# Patient Record
Sex: FEMALE | Race: WHITE | NOT HISPANIC OR LATINO | URBAN - METROPOLITAN AREA
[De-identification: names, ages, dates, MRNs, and addresses within clinical notes are randomized per-mention and may not be internally consistent; named-entity substitution may affect disease eponyms.]

---

## 2023-12-05 ENCOUNTER — TELEPHONE (OUTPATIENT)
Dept: PRIMARY CARE | Facility: CLINIC | Age: 33
End: 2023-12-05
Payer: COMMERCIAL

## 2023-12-05 DIAGNOSIS — Z00.00 ENCOUNTER FOR HEALTH MAINTENANCE EXAMINATION IN ADULT: Primary | ICD-10-CM

## 2023-12-27 ENCOUNTER — OFFICE VISIT (OUTPATIENT)
Dept: OTOLARYNGOLOGY | Facility: CLINIC | Age: 33
End: 2023-12-27
Payer: COMMERCIAL

## 2023-12-27 VITALS — HEIGHT: 63 IN | WEIGHT: 113.8 LBS | BODY MASS INDEX: 20.16 KG/M2 | TEMPERATURE: 97.8 F

## 2023-12-27 DIAGNOSIS — J34.89 NASAL SEPTAL PERFORATION: Primary | ICD-10-CM

## 2023-12-27 PROCEDURE — 1036F TOBACCO NON-USER: CPT | Performed by: OTOLARYNGOLOGY

## 2023-12-27 PROCEDURE — 99203 OFFICE O/P NEW LOW 30 MIN: CPT | Performed by: OTOLARYNGOLOGY

## 2023-12-27 PROCEDURE — 31231 NASAL ENDOSCOPY DX: CPT | Performed by: OTOLARYNGOLOGY

## 2023-12-27 RX ORDER — SERTRALINE HYDROCHLORIDE 100 MG/1
100 TABLET, FILM COATED ORAL
COMMUNITY
Start: 2017-11-03 | End: 2024-02-13 | Stop reason: SDUPTHER

## 2023-12-27 ASSESSMENT — PATIENT HEALTH QUESTIONNAIRE - PHQ9
SUM OF ALL RESPONSES TO PHQ9 QUESTIONS 1 AND 2: 0
2. FEELING DOWN, DEPRESSED OR HOPELESS: NOT AT ALL
1. LITTLE INTEREST OR PLEASURE IN DOING THINGS: NOT AT ALL

## 2023-12-27 NOTE — PROGRESS NOTES
"Chief Complaint:  Septal perforation/headache    Referring Provider: No ref. provider found    History of Present Illness:    Naty Lee is a 33 y.o. female, presenting for evaluation of headache, post-nasal drainage, and septal perforation. She was told she had a septal perforation in 2017 and states it was from OCD-related nasal picking. She has also used intranasal drugs (specifically Ketamine) for an undetermined amount of time. She denies repeated antibiotics, infections, etc. She does have chronic daily headaches which she is concerned may be stemming from the chronic crusting/post-nasal drainage.     ?  Review of Systems:    Review of symptoms was negative except for those stated including Cardiopulmonary, Genitourinary, Gastrointestinal, Psychological, Sleep pattern, Endocrine, Eyes, Neurologic, Musculoskeletal, Skin, Hematologic/Lymphatic and Allergic/Immunologic.     Medical History:    I have reviewed the patient's updated past medical history, surgical history, family history, social history, as well as current medications and allergies as of 12/27/2023. Changes to these items have been updated and marked as reviewed in the electronic medical record.    Physical Exam:    Vitals:  height is 1.594 m (5' 2.75\") and weight is 51.6 kg (113 lb 12.8 oz). Her temperature is 36.6 °C (97.8 °F).   General: Patient doing well overall and is in no apparent distress.  Psych: Pleasant affect, and answers questions appropriately.  Head & Face: Symmetric facial movements  Eyes: Pupils equal, round, reactive.  Extraocular movements intact without gaze restrictions or nystagmus. No epiphora.  Ears:  External auditory canals are normal.  Tympanic membranes are clear.  No middle ear effusion is seen.  All middle ear landmarks are normal.  Nose: Anterior rhinoscopy revealed 1.5 x 1.5 cm anterior septal perforation with adjacent crusting.  More posterior areas of the nasal cavity could not be completely examined.  Oral " Cavity/Oropharynx:  Without lesions or masses to visual exam.  Neck: Supple without lymphadenopathy.  Lungs: Non-labored, and without evidence of stridor.  Cardiac: Pulses are strong, well-perfused.  Extremities: Without gross evidence of clubbing, cyanosis, or edema.  Neuro: Cranial nerves II-XII grossly intact; Intact facial movements.    Procedure:  Rigid nasal endoscopy (30781)  Pre-procedure diagnosis/Indication for procedure:  To evaluate areas not visualized on anterior rhinoscopy   Anesthesia:  1% phenylephrine and 4% lidocaine topical spray   Description:  A 0-degree 4-mm rigid nasal endoscope was used to examine the left and right nasal cavities.  The nasal valve areas were examined for abnormalities or collapse.  The inferior and middle turbinates were evaluated.  The middle and superior meatuses, and the sphenoethmoid recesses were examined and inspected for mucopurulence and polyps. Once the endoscope was withdrawn, the patient was noted to have tolerated the procedure well without complications and was returned to ambulatory status.    Findings:    There is a 1.5x1.5 cm septal perforation with some crusting along the posterior margin and surrounding mucopurulence. The turbinates appear somewhat hyperemic and edematous and there is some mild crusting along them posteriorly. The nasopharynx is clear and the middle meatus is clear bilaterally. She tolerated the procedure well.       Assessment:     Naty Lee is a 33 y.o. female with septal perforation, post-nasal drainage, headaches (possibly unrelated vs. rhinogenic headache from obstructing crusts)    Plan:      We discussed placing a septal button in the office. I advised that it would be a temporizing measure to hopefully prevent crusting in the nose but that she would require a definitive operative repair if she wanted to avoid the button and close the perforation. I also advised her to discontinue intranasal drug use to prevent the perforation from  worsening. We will plan to obtain a septal button and keep it for her when she returns to [place]. She will likely require an anxiolytic prior to this appointment.      Gael Mc MD, M.Eng.   of Otolaryngology - Head & Neck Surgery  Division of Rhinology and Endoscopic Skull Base Surgery  [facility]

## 2024-02-12 PROBLEM — E55.9 VITAMIN D DEFICIENCY: Status: ACTIVE | Noted: 2024-02-12

## 2024-02-12 PROBLEM — N30.90 RECURRENT CYSTITIS: Status: ACTIVE | Noted: 2024-02-12

## 2024-02-12 PROBLEM — R53.83 FATIGUE: Status: ACTIVE | Noted: 2024-02-12

## 2024-02-12 PROBLEM — R51.9 CHRONIC HEAD PAIN: Status: ACTIVE | Noted: 2024-02-12

## 2024-02-12 PROBLEM — M76.32 ILIOTIBIAL BAND SYNDROME OF LEFT SIDE: Status: ACTIVE | Noted: 2024-02-12

## 2024-02-12 PROBLEM — R35.0 URINARY FREQUENCY: Status: ACTIVE | Noted: 2024-02-12

## 2024-02-12 PROBLEM — R51.9 CHRONIC DAILY HEADACHE: Status: ACTIVE | Noted: 2017-11-03

## 2024-02-12 PROBLEM — F32.A DEPRESSION: Status: ACTIVE | Noted: 2024-02-12

## 2024-02-12 PROBLEM — G89.29 CHRONIC HEAD PAIN: Status: ACTIVE | Noted: 2024-02-12

## 2024-02-13 ENCOUNTER — OFFICE VISIT (OUTPATIENT)
Dept: PRIMARY CARE | Facility: CLINIC | Age: 34
End: 2024-02-13
Payer: COMMERCIAL

## 2024-02-13 VITALS
SYSTOLIC BLOOD PRESSURE: 91 MMHG | WEIGHT: 110 LBS | HEART RATE: 61 BPM | OXYGEN SATURATION: 97 % | DIASTOLIC BLOOD PRESSURE: 54 MMHG | BODY MASS INDEX: 19.49 KG/M2 | HEIGHT: 63 IN | TEMPERATURE: 98.6 F

## 2024-02-13 DIAGNOSIS — Z23 NEED FOR TDAP VACCINATION: ICD-10-CM

## 2024-02-13 DIAGNOSIS — F32.A DEPRESSION, UNSPECIFIED DEPRESSION TYPE: ICD-10-CM

## 2024-02-13 DIAGNOSIS — Z11.3 SCREEN FOR STD (SEXUALLY TRANSMITTED DISEASE): ICD-10-CM

## 2024-02-13 DIAGNOSIS — Z00.00 PHYSICAL EXAM: Primary | ICD-10-CM

## 2024-02-13 PROCEDURE — 1036F TOBACCO NON-USER: CPT | Performed by: INTERNAL MEDICINE

## 2024-02-13 PROCEDURE — 90715 TDAP VACCINE 7 YRS/> IM: CPT | Performed by: INTERNAL MEDICINE

## 2024-02-13 PROCEDURE — 90471 IMMUNIZATION ADMIN: CPT | Performed by: INTERNAL MEDICINE

## 2024-02-13 PROCEDURE — 99395 PREV VISIT EST AGE 18-39: CPT | Performed by: INTERNAL MEDICINE

## 2024-02-13 RX ORDER — SERTRALINE HYDROCHLORIDE 50 MG/1
100 TABLET, FILM COATED ORAL DAILY
Qty: 180 TABLET | Refills: 3 | Status: SHIPPED | OUTPATIENT
Start: 2024-02-13 | End: 2025-02-12

## 2024-02-13 ASSESSMENT — PATIENT HEALTH QUESTIONNAIRE - PHQ9
1. LITTLE INTEREST OR PLEASURE IN DOING THINGS: MORE THAN HALF THE DAYS
7. TROUBLE CONCENTRATING ON THINGS, SUCH AS READING THE NEWSPAPER OR WATCHING TELEVISION: MORE THAN HALF THE DAYS
9. THOUGHTS THAT YOU WOULD BE BETTER OFF DEAD, OR OF HURTING YOURSELF: NOT AT ALL
6. FEELING BAD ABOUT YOURSELF - OR THAT YOU ARE A FAILURE OR HAVE LET YOURSELF OR YOUR FAMILY DOWN: MORE THAN HALF THE DAYS
3. TROUBLE FALLING OR STAYING ASLEEP OR SLEEPING TOO MUCH: MORE THAN HALF THE DAYS
8. MOVING OR SPEAKING SO SLOWLY THAT OTHER PEOPLE COULD HAVE NOTICED. OR THE OPPOSITE, BEING SO FIGETY OR RESTLESS THAT YOU HAVE BEEN MOVING AROUND A LOT MORE THAN USUAL: NOT AT ALL
5. POOR APPETITE OR OVEREATING: NOT AT ALL
SUM OF ALL RESPONSES TO PHQ QUESTIONS 1-9: 12
2. FEELING DOWN, DEPRESSED OR HOPELESS: MORE THAN HALF THE DAYS
SUM OF ALL RESPONSES TO PHQ9 QUESTIONS 1 AND 2: 4
4. FEELING TIRED OR HAVING LITTLE ENERGY: MORE THAN HALF THE DAYS

## 2024-02-13 ASSESSMENT — ANXIETY QUESTIONNAIRES
3. WORRYING TOO MUCH ABOUT DIFFERENT THINGS: MORE THAN HALF THE DAYS
5. BEING SO RESTLESS THAT IT IS HARD TO SIT STILL: NOT AT ALL
GAD7 TOTAL SCORE: 8
1. FEELING NERVOUS, ANXIOUS, OR ON EDGE: MORE THAN HALF THE DAYS
6. BECOMING EASILY ANNOYED OR IRRITABLE: SEVERAL DAYS
2. NOT BEING ABLE TO STOP OR CONTROL WORRYING: MORE THAN HALF THE DAYS
4. TROUBLE RELAXING: NOT AT ALL
7. FEELING AFRAID AS IF SOMETHING AWFUL MIGHT HAPPEN: SEVERAL DAYS

## 2024-02-13 NOTE — PROGRESS NOTES
"SUBJECTIVE:   Naty Lee is a 34 y.o. female presenting for her annual physical/wellness.  PCP: Tiffanie España MD  Physical and med refills  She still lives in [facility].  Would like routine check and std check. LMP one month ago.  No std symptoms    Colon screening: na  Last mammogram: na  Dexa na  Vaccines has had HPV. Due for tdap.  Diet:   healthy in general  Exercises:  regularly  Lab Results   Component Value Date    HGBA1C 5.1 12/28/2022     Lab Results   Component Value Date    CREATININE 0.69 12/28/2022     Lab Results   Component Value Date    WBC 6.9 12/28/2022    HGB 13.1 12/28/2022    HCT 40.3 12/28/2022    MCV 98 12/28/2022     12/28/2022     Lab Results   Component Value Date    CHOL 142 12/28/2022    CHOL 145 10/04/2021     Lab Results   Component Value Date    HDL 59.7 12/28/2022    HDL 69.6 10/04/2021     No results found for: \"LDLCALC\"  Lab Results   Component Value Date    TRIG 85 12/28/2022    TRIG 59 10/04/2021     No components found for: \"CHOLHDL\"       ROS:   Feeling well. No dyspnea or chest pain on exertion. No abdominal pain, change in bowel habits, black or bloody stools. No urinary tract or  symptoms.  No breast concerns. No neurological complaints.    OBJECTIVE:   The patient appears well, alert, oriented x 3, in no distress.   BP 91/54   Pulse 61   Temp 37 °C (98.6 °F)   Ht 1.607 m (5' 3.25\")   Wt 49.9 kg (110 lb)   SpO2 97%   BMI 19.33 kg/m²   ENT normal.  Neck supple. No adenopathy or thyromegaly. SHIKHA. Lungs are clear, good air entry, no wheezes, rhonchi or rales. S1 and S2 normal, no murmurs, regular rate and rhythm. Abdomen is soft without tenderness, guarding, mass or organomegaly.  exam deferred to Gyn. Extremities show no edema, normal peripheral pulses. Neurological is normal without focal findings.      1. Physical exam        2. Depression, unspecified depression type  sertraline (Zoloft) 50 mg tablet      3. Screen for STD (sexually transmitted disease)  " Hepatitis C Antibody, HIV 1/2 Antigen/Antibody Screen with Reflex to Confirmation, C. Trachomatis / N. Gonorrhoeae, Amplified Detection      4. Need for Tdap vaccination  Tdap vaccine, age 7 years and older  (BOOSTRIX)

## 2024-02-14 ENCOUNTER — LAB (OUTPATIENT)
Dept: LAB | Facility: LAB | Age: 34
End: 2024-02-14
Payer: COMMERCIAL

## 2024-02-14 ENCOUNTER — OFFICE VISIT (OUTPATIENT)
Dept: OTOLARYNGOLOGY | Facility: CLINIC | Age: 34
End: 2024-02-14
Payer: COMMERCIAL

## 2024-02-14 ENCOUNTER — APPOINTMENT (OUTPATIENT)
Dept: OTOLARYNGOLOGY | Facility: CLINIC | Age: 34
End: 2024-02-14
Payer: COMMERCIAL

## 2024-02-14 VITALS — TEMPERATURE: 97.3 F | BODY MASS INDEX: 20.11 KG/M2 | WEIGHT: 114.4 LBS

## 2024-02-14 DIAGNOSIS — Z11.3 SCREEN FOR STD (SEXUALLY TRANSMITTED DISEASE): ICD-10-CM

## 2024-02-14 DIAGNOSIS — J34.89 NASAL OBSTRUCTION: ICD-10-CM

## 2024-02-14 DIAGNOSIS — Z00.00 ENCOUNTER FOR HEALTH MAINTENANCE EXAMINATION IN ADULT: ICD-10-CM

## 2024-02-14 DIAGNOSIS — J34.89 NASAL SEPTAL PERFORATION: Primary | ICD-10-CM

## 2024-02-14 LAB
ALBUMIN SERPL BCP-MCNC: 4.5 G/DL (ref 3.4–5)
ALP SERPL-CCNC: 46 U/L (ref 33–110)
ALT SERPL W P-5'-P-CCNC: 7 U/L (ref 7–45)
ANION GAP SERPL CALC-SCNC: 12 MMOL/L (ref 10–20)
AST SERPL W P-5'-P-CCNC: 20 U/L (ref 9–39)
BILIRUB SERPL-MCNC: 0.4 MG/DL (ref 0–1.2)
BUN SERPL-MCNC: 12 MG/DL (ref 6–23)
CALCIUM SERPL-MCNC: 9.6 MG/DL (ref 8.6–10.6)
CHLORIDE SERPL-SCNC: 104 MMOL/L (ref 98–107)
CHOLEST SERPL-MCNC: 153 MG/DL (ref 0–199)
CHOLESTEROL/HDL RATIO: 2.3
CO2 SERPL-SCNC: 29 MMOL/L (ref 21–32)
CREAT SERPL-MCNC: 0.71 MG/DL (ref 0.5–1.05)
EGFRCR SERPLBLD CKD-EPI 2021: >90 ML/MIN/1.73M*2
ERYTHROCYTE [DISTWIDTH] IN BLOOD BY AUTOMATED COUNT: 12.8 % (ref 11.5–14.5)
EST. AVERAGE GLUCOSE BLD GHB EST-MCNC: 97 MG/DL
GLUCOSE SERPL-MCNC: 80 MG/DL (ref 74–99)
HBA1C MFR BLD: 5 %
HCT VFR BLD AUTO: 39.5 % (ref 36–46)
HCV AB SER QL: NONREACTIVE
HDLC SERPL-MCNC: 66 MG/DL
HGB BLD-MCNC: 12.6 G/DL (ref 12–16)
HIV 1+2 AB+HIV1 P24 AG SERPL QL IA: NONREACTIVE
LDLC SERPL CALC-MCNC: 72 MG/DL
MCH RBC QN AUTO: 30.7 PG (ref 26–34)
MCHC RBC AUTO-ENTMCNC: 31.9 G/DL (ref 32–36)
MCV RBC AUTO: 96 FL (ref 80–100)
NON HDL CHOLESTEROL: 87 MG/DL (ref 0–149)
NRBC BLD-RTO: 0 /100 WBCS (ref 0–0)
PLATELET # BLD AUTO: 290 X10*3/UL (ref 150–450)
POTASSIUM SERPL-SCNC: 4 MMOL/L (ref 3.5–5.3)
PROT SERPL-MCNC: 6.5 G/DL (ref 6.4–8.2)
RBC # BLD AUTO: 4.1 X10*6/UL (ref 4–5.2)
SODIUM SERPL-SCNC: 141 MMOL/L (ref 136–145)
TRIGL SERPL-MCNC: 74 MG/DL (ref 0–149)
VLDL: 15 MG/DL (ref 0–40)
WBC # BLD AUTO: 9.1 X10*3/UL (ref 4.4–11.3)

## 2024-02-14 PROCEDURE — 80053 COMPREHEN METABOLIC PANEL: CPT

## 2024-02-14 PROCEDURE — 99214 OFFICE O/P EST MOD 30 MIN: CPT | Performed by: OTOLARYNGOLOGY

## 2024-02-14 PROCEDURE — 85027 COMPLETE CBC AUTOMATED: CPT

## 2024-02-14 PROCEDURE — 36415 COLL VENOUS BLD VENIPUNCTURE: CPT

## 2024-02-14 PROCEDURE — 87389 HIV-1 AG W/HIV-1&-2 AB AG IA: CPT

## 2024-02-14 PROCEDURE — 1036F TOBACCO NON-USER: CPT | Performed by: OTOLARYNGOLOGY

## 2024-02-14 PROCEDURE — 86803 HEPATITIS C AB TEST: CPT

## 2024-02-14 PROCEDURE — 87800 DETECT AGNT MULT DNA DIREC: CPT

## 2024-02-14 PROCEDURE — 31231 NASAL ENDOSCOPY DX: CPT | Performed by: OTOLARYNGOLOGY

## 2024-02-14 PROCEDURE — 80061 LIPID PANEL: CPT

## 2024-02-14 PROCEDURE — 83036 HEMOGLOBIN GLYCOSYLATED A1C: CPT

## 2024-02-14 ASSESSMENT — ENCOUNTER SYMPTOMS: DEPRESSION: 0

## 2024-02-14 NOTE — PROGRESS NOTES
Chief Complaint:  Septal perforation/headache    Referring Provider: No ref. provider found    History of Present Illness:    Naty Lee is a 34 y.o. female, presenting for evaluation of headache, post-nasal drainage, and septal perforation.  She is following up today to discuss possible placement of a septal button versus repair.  She is quite anxious about either intervention.  No significant changes to her symptomology.    ?  Review of Systems:    Review of symptoms was negative except for those stated including Cardiopulmonary, Genitourinary, Gastrointestinal, Psychological, Sleep pattern, Endocrine, Eyes, Neurologic, Musculoskeletal, Skin, Hematologic/Lymphatic and Allergic/Immunologic.     Medical History:    I have reviewed the patient's updated past medical history, surgical history, family history, social history, as well as current medications and allergies as of 2/14/2024. Changes to these items have been updated and marked as reviewed in the electronic medical record.    Physical Exam:    Vitals:  weight is 51.9 kg (114 lb 6.4 oz). Her temperature is 36.3 °C (97.3 °F).   General: Patient doing well overall and is in no apparent distress.  Psych: Pleasant affect, and answers questions appropriately.  Head & Face: Symmetric facial movements  Eyes: Pupils equal, round, reactive.  Extraocular movements intact without gaze restrictions or nystagmus. No epiphora.  Ears:  External auditory canals are normal.  Tympanic membranes are clear.  No middle ear effusion is seen.  All middle ear landmarks are normal.  Nose: Anterior rhinoscopy revealed 1.5 x 1.5 cm anterior septal perforation with adjacent crusting although improved from previous examination..  More posterior areas of the nasal cavity could not be completely examined.  Oral Cavity/Oropharynx:  Without lesions or masses to visual exam.  Neck: Supple without lymphadenopathy.  Lungs: Non-labored, and without evidence of stridor.  Cardiac: Pulses are  strong, well-perfused.  Extremities: Without gross evidence of clubbing, cyanosis, or edema.  Neuro: Cranial nerves II-XII grossly intact; Intact facial movements.    Procedure:  Rigid nasal endoscopy (56970)  Pre-procedure diagnosis/Indication for procedure:  To evaluate areas not visualized on anterior rhinoscopy   Anesthesia:  1% phenylephrine and 4% lidocaine topical spray   Description:  A 0-degree 4-mm rigid nasal endoscope was used to examine the left and right nasal cavities.  The nasal valve areas were examined for abnormalities or collapse.  The inferior and middle turbinates were evaluated.  The middle and superior meatuses, and the sphenoethmoid recesses were examined and inspected for mucopurulence and polyps. Once the endoscope was withdrawn, the patient was noted to have tolerated the procedure well without complications and was returned to ambulatory status.    Findings:    There is a 1.5x1.5 cm septal perforation with some mild crusting along the posterior margin and surrounding mucopurulence. The turbinates appear somewhat hyperemic and edematous.The nasopharynx is clear and the middle meatus is clear bilaterally. She tolerated the procedure well.       Assessment:     Naty Lee is a 34 y.o. female with septal perforation, post-nasal drainage, headaches (possibly unrelated vs. rhinogenic headache from obstructing crusts)    Plan:      We had an extensive conversation about her septal perforation as well as her symptoms and possible strategies for mitigating them.  I explained the risks benefits and alternatives to placing a septal button.  I also in general discussed what definitive repair with mucosal rotational flaps would entail.  I also explained that her nasal obstruction may only be partially related to the perforation and surrounding inflammation and that there may be other evaluation and treatment that may be required.  They would like to consult with our facial plastic surgery colleagues  to see if a definitive repair would be a good option for her.  She is also interested in potentially changing the way her nose looks so I have referred them to facial plastics for additional evaluation.      Gael Mc MD, M.Eng.   of Otolaryngology - Head & Neck Surgery  Division of Rhinology and Endoscopic Skull Base Surgery  [facility]

## 2024-02-15 LAB
C TRACH RRNA SPEC QL NAA+PROBE: NEGATIVE
N GONORRHOEA DNA SPEC QL PROBE+SIG AMP: NEGATIVE

## 2024-03-29 PROBLEM — J34.89 NASAL OBSTRUCTION: Status: ACTIVE | Noted: 2024-03-29

## 2024-03-29 PROBLEM — R06.89 DIFFICULTY BREATHING: Status: ACTIVE | Noted: 2024-03-29

## 2024-03-29 PROBLEM — J34.89 NASAL SEPTAL PERFORATION: Status: ACTIVE | Noted: 2024-03-29

## 2024-03-29 NOTE — PROGRESS NOTES
Facial Plastic & Reconstructive Surgery    Reason for consult: Nasal perforation, PND, nasal obstruction    Referring provider: Dr. Mc    Chief Complaint: Obstructed breathing, perforation, PND    33yo female referred to our office for surgical evaluation for 1.5 cm diameter septal perforation found by Dr. Mc.  She is here to discuss septal repair surgery and nasal valve collapse with aesthetic concerns.    Constant, present year round, does not fluctuate. It affects the patients ability to sleep, exercise, and is troubling during the day.    Symptoms began: Incidentally discovered in 2017 for congestion, she has chronic PND that is bothersome    Nasal steroid or spray: Not recommended in setting of perforation. She does use saline spray. No ipratropium spray    Site of obstruction: Bilateral    Previous Otolaryngology Provider: 2017 [facility]  Previous documentation for this patient was extensively reviewed including specific reasons for evaluation. Complex nature of complaint and medical issues prompting evaluation for surgical consideration by facial plastic & reconstructive surgeon.    Previous surgery: No    Previous CT/MRI imaging of the nasal cavity and sinuses: None    Trauma history: No    Sleep apnea or snoring history: No    Allergic rhinitis, sinusitis history: No    Smoking: No    Aesthetic concern: Yes    Past Medical History  She has no past medical history on file.    Surgical History  She has a past surgical history that includes Other surgical history (05/08/2020).     Social History  She reports that she has never smoked. She has never used smokeless tobacco. She reports current alcohol use of about 5.0 standard drinks of alcohol per week. She reports that she does not use drugs.    Family History  Family History   Problem Relation Name Age of Onset    No Known Problems Mother      No Known Problems Father      No Known Problems Sister      No Known Problems Brother           Allergies  Patient has no known allergies.    Physical exam    General: Well-developed and well-nourished in appearance.  Skin: No rashes or concerning lesions on the visible portions of the skin.  Eyes: Extraocular movements intact. Visual fields grossly normal.  Ears: Pinna are normal in shape and position. External canals are patent.  Oral Cavity/Oropharynx: Dentition is intact. Mucous membranes moist. No masses or lesions.  Respiratory: No respiratory distress. Quiet breathing without stertor or stridor.  Cardiovascular: Regular rate and rhythm. Warm extremities with equal pulses.  Psych: Normal mood and affect. Judgement and insight appropriate.  Neuro: Alert and oriented. CN II-XII grossly intact. No focal deficits.  Musculoskeletal: Gait intact. Moves all extremities well without apparent deformities.    A comprehensive facial exam was performed with the following highlights:    Nasal skin type: Moderate thickness    External exam:  Tip: Bulbous  Tip rotation: under  Projection: sid  Dorsum: sid  Base width: wide  Asymmetries: mild tip asymmetries    Internal exam:   Septum: septal perforation present more posteriorly, mild septal deviations  Inferior turbinates: hypertrophied turbinates bilaterally  Nasal valve angle: reduced with significant valve collapse on inspiratory phase shown on photodocumentation                Intranasal examination performed with limited view on anterior rhinoscopy.    Procedures:    Procedure: Rigid diagnostic nasal endoscopy  Surgeon: Delroy Galvan MD  Anesthesia: None  Timeout: Performed  Findings: A 0-degree rigid endoscope was passed through the patient's bilateral naris. The first pass was along the floor of the nose to the nasopharynx. The second pass was to the area of the middle meatus. The third pass was to the sphenoethmoidal recess.    Septum: Deviation is S shaped with presence of a 1.5 AP perforation.  Internal Nasal Valve: Angle is reduced, narrowing the nasal  airway bilaterally.    Right nasal cavity:  Inferior turbinate: 2+  Inferior meatus: Clear, no discharge, no polyps/masses/lesions  Middle meatus: Clear, no discharge, no polyps/masses/lesions    Left nasal cavity:  Inferior turbinate: 2+  Inferior meatus: Clear, no discharge, no polyps/masses/lesions  Middle meatus: Clear, no discharge, no polyps/masses/lesions  Nasopharynx: Clear, no discharge, no masses/lesions    Modified [place] Maneuver: Performed with a cotton tipped applicator, markedly improves breathing bilaterally.    Assessment - This patient has a septal perforation, PND, valve collapse, septal deviation, chronic picking of the nose now subsided. The cause is multifactorial with septal deviation, perforation, nasal valve narrowing, turbinate hypertrophy, and some dynamic nasal valve collapse as well. Correction of these issues would require a septoplasty with septal perforation repair, fascia grafting wrapped with PDS plate, repair of nasal valve collapse with structural grafting, and a bilateral turbinate reduction. We discussed the medical necessity of this at length, as well as the risks and limitations of the procedures. We discussed cosmetic rhinoplasty and alarplasty at the time of operation. All questions were answered.      Plan - Would like to trial ipratropium spray first to test if PND would resolve with medical treatment as this is most bothersome, otherwise may consider surgery. RTC 1-2 months.    Today, I was in direct face-to-face consultation with the patient, of which greater than 50% of the time was spent discussing the diagnoses, treatment plan, counseling, and care coordination. The patient and/or caregiver voiced understanding of our conversation and all questions were satisfactorily answered.    Total time for this visit: 30

## 2024-04-03 ENCOUNTER — OFFICE VISIT (OUTPATIENT)
Dept: OTOLARYNGOLOGY | Facility: CLINIC | Age: 34
End: 2024-04-03
Payer: COMMERCIAL

## 2024-04-03 VITALS
HEART RATE: 77 BPM | DIASTOLIC BLOOD PRESSURE: 64 MMHG | SYSTOLIC BLOOD PRESSURE: 115 MMHG | WEIGHT: 112 LBS | HEIGHT: 63 IN | BODY MASS INDEX: 19.84 KG/M2

## 2024-04-03 DIAGNOSIS — R06.89 DIFFICULTY BREATHING: Primary | ICD-10-CM

## 2024-04-03 DIAGNOSIS — J34.89 NASAL OBSTRUCTION: ICD-10-CM

## 2024-04-03 DIAGNOSIS — J34.3 HYPERTROPHY OF NASAL TURBINATES: ICD-10-CM

## 2024-04-03 DIAGNOSIS — R09.82 POST-NASAL DRIP: ICD-10-CM

## 2024-04-03 DIAGNOSIS — J34.89 NASAL SEPTAL PERFORATION: ICD-10-CM

## 2024-04-03 DIAGNOSIS — J34.2 DEVIATED NASAL SEPTUM: ICD-10-CM

## 2024-04-03 DIAGNOSIS — J30.89 NON-SEASONAL ALLERGIC RHINITIS DUE TO OTHER ALLERGIC TRIGGER: ICD-10-CM

## 2024-04-03 PROCEDURE — 31231 NASAL ENDOSCOPY DX: CPT | Performed by: OTOLARYNGOLOGY

## 2024-04-03 PROCEDURE — 99214 OFFICE O/P EST MOD 30 MIN: CPT | Performed by: OTOLARYNGOLOGY

## 2024-04-03 RX ORDER — IPRATROPIUM BROMIDE 42 UG/1
2 SPRAY, METERED NASAL 3 TIMES DAILY
Qty: 15 ML | Refills: 0 | Status: SHIPPED | OUTPATIENT
Start: 2024-04-03 | End: 2024-05-17 | Stop reason: SDUPTHER

## 2024-04-29 ENCOUNTER — APPOINTMENT (OUTPATIENT)
Dept: OTOLARYNGOLOGY | Facility: CLINIC | Age: 34
End: 2024-04-29
Payer: COMMERCIAL

## 2024-05-17 ENCOUNTER — TELEPHONE (OUTPATIENT)
Dept: OTOLARYNGOLOGY | Facility: CLINIC | Age: 34
End: 2024-05-17
Payer: COMMERCIAL

## 2024-05-17 DIAGNOSIS — J30.89 NON-SEASONAL ALLERGIC RHINITIS DUE TO OTHER ALLERGIC TRIGGER: ICD-10-CM

## 2024-05-17 DIAGNOSIS — R06.89 DIFFICULTY BREATHING: Primary | ICD-10-CM

## 2024-05-17 RX ORDER — IPRATROPIUM BROMIDE 42 UG/1
2 SPRAY, METERED NASAL 3 TIMES DAILY
Qty: 15 ML | Refills: 5 | Status: SHIPPED | OUTPATIENT
Start: 2024-05-17 | End: 2024-05-21

## 2024-05-20 NOTE — TELEPHONE ENCOUNTER
Rx refilled per patient request for Ipotropium.  Patient contacted with this update.     Atul Fields PA-C

## 2025-02-14 DIAGNOSIS — F32.A DEPRESSION, UNSPECIFIED DEPRESSION TYPE: ICD-10-CM

## 2025-02-17 RX ORDER — SERTRALINE HYDROCHLORIDE 50 MG/1
100 TABLET, FILM COATED ORAL DAILY
Qty: 60 TABLET | Refills: 0 | Status: SHIPPED | OUTPATIENT
Start: 2025-02-17 | End: 2025-03-19

## 2025-02-27 ENCOUNTER — APPOINTMENT (OUTPATIENT)
Dept: PRIMARY CARE | Facility: CLINIC | Age: 35
End: 2025-02-27
Payer: COMMERCIAL

## 2025-02-27 VITALS
TEMPERATURE: 98.5 F | BODY MASS INDEX: 20.55 KG/M2 | OXYGEN SATURATION: 96 % | WEIGHT: 116 LBS | DIASTOLIC BLOOD PRESSURE: 56 MMHG | HEART RATE: 78 BPM | SYSTOLIC BLOOD PRESSURE: 92 MMHG | HEIGHT: 63 IN

## 2025-02-27 DIAGNOSIS — Z00.00 PHYSICAL EXAM: Primary | ICD-10-CM

## 2025-02-27 DIAGNOSIS — J30.89 NON-SEASONAL ALLERGIC RHINITIS DUE TO OTHER ALLERGIC TRIGGER: ICD-10-CM

## 2025-02-27 DIAGNOSIS — F32.A DEPRESSION, UNSPECIFIED DEPRESSION TYPE: ICD-10-CM

## 2025-02-27 PROCEDURE — 90480 ADMN SARSCOV2 VAC 1/ONLY CMP: CPT | Performed by: INTERNAL MEDICINE

## 2025-02-27 PROCEDURE — 1036F TOBACCO NON-USER: CPT | Performed by: INTERNAL MEDICINE

## 2025-02-27 PROCEDURE — 90707 MMR VACCINE SC: CPT | Performed by: INTERNAL MEDICINE

## 2025-02-27 PROCEDURE — 91320 SARSCV2 VAC 30MCG TRS-SUC IM: CPT | Performed by: INTERNAL MEDICINE

## 2025-02-27 PROCEDURE — 90471 IMMUNIZATION ADMIN: CPT | Performed by: INTERNAL MEDICINE

## 2025-02-27 PROCEDURE — 3008F BODY MASS INDEX DOCD: CPT | Performed by: INTERNAL MEDICINE

## 2025-02-27 PROCEDURE — 99395 PREV VISIT EST AGE 18-39: CPT | Performed by: INTERNAL MEDICINE

## 2025-02-27 RX ORDER — IPRATROPIUM BROMIDE 42 UG/1
2 SPRAY, METERED NASAL 3 TIMES DAILY
Qty: 15 ML | Refills: 5 | Status: SHIPPED | OUTPATIENT
Start: 2025-02-27 | End: 2025-03-03

## 2025-02-27 RX ORDER — SERTRALINE HYDROCHLORIDE 50 MG/1
100 TABLET, FILM COATED ORAL DAILY
Qty: 180 TABLET | Refills: 3 | Status: SHIPPED | OUTPATIENT
Start: 2025-02-27 | End: 2026-02-27

## 2025-02-27 ASSESSMENT — ANXIETY QUESTIONNAIRES
1. FEELING NERVOUS, ANXIOUS, OR ON EDGE: MORE THAN HALF THE DAYS
5. BEING SO RESTLESS THAT IT IS HARD TO SIT STILL: NOT AT ALL
7. FEELING AFRAID AS IF SOMETHING AWFUL MIGHT HAPPEN: SEVERAL DAYS
4. TROUBLE RELAXING: NOT AT ALL
3. WORRYING TOO MUCH ABOUT DIFFERENT THINGS: MORE THAN HALF THE DAYS
2. NOT BEING ABLE TO STOP OR CONTROL WORRYING: MORE THAN HALF THE DAYS
GAD7 TOTAL SCORE: 8
IF YOU CHECKED OFF ANY PROBLEMS ON THIS QUESTIONNAIRE, HOW DIFFICULT HAVE THESE PROBLEMS MADE IT FOR YOU TO DO YOUR WORK, TAKE CARE OF THINGS AT HOME, OR GET ALONG WITH OTHER PEOPLE: NOT DIFFICULT AT ALL
6. BECOMING EASILY ANNOYED OR IRRITABLE: SEVERAL DAYS

## 2025-02-27 ASSESSMENT — PATIENT HEALTH QUESTIONNAIRE - PHQ9
9. THOUGHTS THAT YOU WOULD BE BETTER OFF DEAD, OR OF HURTING YOURSELF: NOT AT ALL
6. FEELING BAD ABOUT YOURSELF - OR THAT YOU ARE A FAILURE OR HAVE LET YOURSELF OR YOUR FAMILY DOWN: SEVERAL DAYS
1. LITTLE INTEREST OR PLEASURE IN DOING THINGS: NEARLY EVERY DAY
10. IF YOU CHECKED OFF ANY PROBLEMS, HOW DIFFICULT HAVE THESE PROBLEMS MADE IT FOR YOU TO DO YOUR WORK, TAKE CARE OF THINGS AT HOME, OR GET ALONG WITH OTHER PEOPLE: SOMEWHAT DIFFICULT
SUM OF ALL RESPONSES TO PHQ9 QUESTIONS 1 AND 2: 6
8. MOVING OR SPEAKING SO SLOWLY THAT OTHER PEOPLE COULD HAVE NOTICED. OR THE OPPOSITE, BEING SO FIGETY OR RESTLESS THAT YOU HAVE BEEN MOVING AROUND A LOT MORE THAN USUAL: NOT AT ALL
2. FEELING DOWN, DEPRESSED OR HOPELESS: NEARLY EVERY DAY
7. TROUBLE CONCENTRATING ON THINGS, SUCH AS READING THE NEWSPAPER OR WATCHING TELEVISION: SEVERAL DAYS
5. POOR APPETITE OR OVEREATING: NOT AT ALL
SUM OF ALL RESPONSES TO PHQ QUESTIONS 1-9: 14
3. TROUBLE FALLING OR STAYING ASLEEP OR SLEEPING TOO MUCH: NEARLY EVERY DAY
4. FEELING TIRED OR HAVING LITTLE ENERGY: NEARLY EVERY DAY

## 2025-02-27 NOTE — PROGRESS NOTES
"Tiffanie España MD  SUBJECTIVE:     Naty Lee is a 35 y.o. female presenting for her annual physical/wellness.    Doing well. No concerns.  Taking sertraline for depression, doing well.    Colon screening: na  Last pap: she said she saw gyn 2 years ago  Last mammogram: na  Dexa na  Vaccines might have had only one dose of MMR, so will get MMR updated as well as covid booster.  Diet:   healthy in general  Exercises:  regularly  Lab Results   Component Value Date    HGBA1C 5.0 02/14/2024     Lab Results   Component Value Date    CREATININE 0.71 02/14/2024     Lab Results   Component Value Date    WBC 9.1 02/14/2024    HGB 12.6 02/14/2024    HCT 39.5 02/14/2024    MCV 96 02/14/2024     02/14/2024     Lab Results   Component Value Date    CHOL 153 02/14/2024    CHOL 142 12/28/2022    CHOL 145 10/04/2021     Lab Results   Component Value Date    HDL 66.0 02/14/2024    HDL 59.7 12/28/2022    HDL 69.6 10/04/2021     Lab Results   Component Value Date    LDLCALC 72 02/14/2024     Lab Results   Component Value Date    TRIG 74 02/14/2024    TRIG 85 12/28/2022    TRIG 59 10/04/2021     No components found for: \"CHOLHDL\"       ROS:   Feeling well. No dyspnea or chest pain on exertion. No abdominal pain, change in bowel habits, black or bloody stools. No urinary tract or  symptoms.  No breast concerns. No neurological complaints.    OBJECTIVE:   The patient appears well, alert, oriented x 3, in no distress.   BP 92/56   Pulse 78   Temp 36.9 °C (98.5 °F)   Ht 1.6 m (5' 3\")   Wt 52.6 kg (116 lb)   SpO2 96%   BMI 20.55 kg/m²   ENT normal.  Neck supple. No adenopathy or thyromegaly. SHIKHA. Lungs are clear, good air entry, no wheezes, rhonchi or rales. S1 and S2 normal, no murmurs, regular rate and rhythm. Abdomen is soft without tenderness, guarding, mass or organomegaly.  exam deferred to Gyn. Extremities show no edema, normal peripheral pulses. Neurological is normal without focal findings.    Assessment & " Plan  Physical exam         Non-seasonal allergic rhinitis due to other allergic trigger    Orders:    ipratropium (Atrovent) 42 mcg (0.06 %) nasal spray; Administer 2 sprays into each nostril 3 times a day for 4 days.    Depression, unspecified depression type    Orders:    sertraline (Zoloft) 50 mg tablet; Take 2 tablets (100 mg) by mouth once daily.

## 2025-02-27 NOTE — ASSESSMENT & PLAN NOTE
Orders:    ipratropium (Atrovent) 42 mcg (0.06 %) nasal spray; Administer 2 sprays into each nostril 3 times a day for 4 days.

## 2025-05-15 ENCOUNTER — APPOINTMENT (OUTPATIENT)
Dept: NEUROLOGY | Facility: CLINIC | Age: 35
End: 2025-05-15
Payer: COMMERCIAL

## 2025-05-15 VITALS
RESPIRATION RATE: 16 BRPM | DIASTOLIC BLOOD PRESSURE: 78 MMHG | BODY MASS INDEX: 20.09 KG/M2 | HEIGHT: 63 IN | SYSTOLIC BLOOD PRESSURE: 120 MMHG | WEIGHT: 113.4 LBS | HEART RATE: 72 BPM

## 2025-05-15 DIAGNOSIS — F32.A DEPRESSION, UNSPECIFIED DEPRESSION TYPE: ICD-10-CM

## 2025-05-15 DIAGNOSIS — G43.009 MIGRAINE WITHOUT AURA AND WITHOUT STATUS MIGRAINOSUS, NOT INTRACTABLE: Primary | ICD-10-CM

## 2025-05-15 DIAGNOSIS — G44.40 MEDICATION OVERUSE HEADACHE: ICD-10-CM

## 2025-05-15 DIAGNOSIS — G47.00 INSOMNIA, UNSPECIFIED TYPE: ICD-10-CM

## 2025-05-15 PROCEDURE — 3008F BODY MASS INDEX DOCD: CPT

## 2025-05-15 PROCEDURE — 99205 OFFICE O/P NEW HI 60 MIN: CPT

## 2025-05-15 RX ORDER — RIZATRIPTAN BENZOATE 10 MG/1
10 TABLET ORAL ONCE AS NEEDED
Qty: 9 TABLET | Refills: 2 | Status: SHIPPED | OUTPATIENT
Start: 2025-05-15

## 2025-05-15 RX ORDER — AMITRIPTYLINE HYDROCHLORIDE 25 MG/1
25 TABLET, FILM COATED ORAL DAILY
Qty: 30 TABLET | Refills: 2 | Status: SHIPPED | OUTPATIENT
Start: 2025-05-15 | End: 2025-08-13

## 2025-05-15 RX ORDER — CHOLECALCIFEROL (VITAMIN D3) 25 MCG
25 TABLET ORAL DAILY
COMMUNITY

## 2025-05-15 ASSESSMENT — PATIENT HEALTH QUESTIONNAIRE - PHQ9
SUM OF ALL RESPONSES TO PHQ9 QUESTIONS 1 AND 2: 2
SUM OF ALL RESPONSES TO PHQ9 QUESTIONS 1 AND 2: 2
2. FEELING DOWN, DEPRESSED OR HOPELESS: SEVERAL DAYS
2. FEELING DOWN, DEPRESSED OR HOPELESS: SEVERAL DAYS
10. IF YOU CHECKED OFF ANY PROBLEMS, HOW DIFFICULT HAVE THESE PROBLEMS MADE IT FOR YOU TO DO YOUR WORK, TAKE CARE OF THINGS AT HOME, OR GET ALONG WITH OTHER PEOPLE: SOMEWHAT DIFFICULT
1. LITTLE INTEREST OR PLEASURE IN DOING THINGS: SEVERAL DAYS
10. IF YOU CHECKED OFF ANY PROBLEMS, HOW DIFFICULT HAVE THESE PROBLEMS MADE IT FOR YOU TO DO YOUR WORK, TAKE CARE OF THINGS AT HOME, OR GET ALONG WITH OTHER PEOPLE: SOMEWHAT DIFFICULT
1. LITTLE INTEREST OR PLEASURE IN DOING THINGS: SEVERAL DAYS

## 2025-05-15 ASSESSMENT — LIFESTYLE VARIABLES
AUDIT-C TOTAL SCORE: 3
HOW OFTEN DO YOU HAVE SIX OR MORE DRINKS ON ONE OCCASION: NEVER
SKIP TO QUESTIONS 9-10: 0
HOW OFTEN DO YOU HAVE A DRINK CONTAINING ALCOHOL: 2-4 TIMES A MONTH
HOW MANY STANDARD DRINKS CONTAINING ALCOHOL DO YOU HAVE ON A TYPICAL DAY: 3 OR 4

## 2025-05-15 ASSESSMENT — PAIN SCALES - GENERAL: PAINLEVEL_OUTOF10: 5

## 2025-05-29 ENCOUNTER — APPOINTMENT (OUTPATIENT)
Dept: PRIMARY CARE | Facility: CLINIC | Age: 35
End: 2025-05-29
Payer: COMMERCIAL

## 2025-06-20 ENCOUNTER — TELEPHONE (OUTPATIENT)
Dept: NEUROLOGY | Facility: CLINIC | Age: 35
End: 2025-06-20
Payer: COMMERCIAL

## 2025-07-14 ENCOUNTER — APPOINTMENT (OUTPATIENT)
Dept: NEUROLOGY | Facility: CLINIC | Age: 35
End: 2025-07-14
Payer: COMMERCIAL

## 2025-07-26 ENCOUNTER — PATIENT MESSAGE (OUTPATIENT)
Dept: PRIMARY CARE | Facility: CLINIC | Age: 35
End: 2025-07-26
Payer: COMMERCIAL

## 2025-07-26 DIAGNOSIS — Z00.00 ENCOUNTER FOR HEALTH MAINTENANCE EXAMINATION IN ADULT: ICD-10-CM

## 2025-07-26 DIAGNOSIS — E55.9 VITAMIN D DEFICIENCY: Primary | ICD-10-CM

## 2025-08-14 ENCOUNTER — APPOINTMENT (OUTPATIENT)
Dept: NEUROLOGY | Facility: CLINIC | Age: 35
End: 2025-08-14
Payer: COMMERCIAL

## 2025-08-15 LAB
25(OH)D3+25(OH)D2 SERPL-MCNC: 47 NG/ML (ref 30–100)
ALBUMIN SERPL-MCNC: 4.3 G/DL (ref 3.6–5.1)
ALP SERPL-CCNC: 49 U/L (ref 31–125)
ALT SERPL-CCNC: 5 U/L (ref 6–29)
ANION GAP SERPL CALCULATED.4IONS-SCNC: 6 MMOL/L (CALC) (ref 7–17)
AST SERPL-CCNC: 15 U/L (ref 10–30)
BILIRUB SERPL-MCNC: 0.4 MG/DL (ref 0.2–1.2)
BUN SERPL-MCNC: 11 MG/DL (ref 7–25)
CALCIUM SERPL-MCNC: 9.1 MG/DL (ref 8.6–10.2)
CHLORIDE SERPL-SCNC: 103 MMOL/L (ref 98–110)
CHOLEST SERPL-MCNC: 165 MG/DL
CHOLEST/HDLC SERPL: 2 (CALC)
CO2 SERPL-SCNC: 28 MMOL/L (ref 20–32)
CREAT SERPL-MCNC: 0.68 MG/DL (ref 0.5–0.97)
EGFRCR SERPLBLD CKD-EPI 2021: 116 ML/MIN/1.73M2
ERYTHROCYTE [DISTWIDTH] IN BLOOD BY AUTOMATED COUNT: 13.5 % (ref 11–15)
EST. AVERAGE GLUCOSE BLD GHB EST-MCNC: 100 MG/DL
EST. AVERAGE GLUCOSE BLD GHB EST-SCNC: 5.5 MMOL/L
GLUCOSE SERPL-MCNC: 96 MG/DL (ref 65–99)
HBA1C MFR BLD: 5.1 %
HCT VFR BLD AUTO: 39.9 % (ref 35–45)
HDLC SERPL-MCNC: 83 MG/DL
HGB BLD-MCNC: 12.9 G/DL (ref 11.7–15.5)
LDLC SERPL CALC-MCNC: 62 MG/DL (CALC)
MCH RBC QN AUTO: 32.5 PG (ref 27–33)
MCHC RBC AUTO-ENTMCNC: 32.3 G/DL (ref 32–36)
MCV RBC AUTO: 100.5 FL (ref 80–100)
NONHDLC SERPL-MCNC: 82 MG/DL (CALC)
PLATELET # BLD AUTO: 284 THOUSAND/UL (ref 140–400)
PMV BLD REES-ECKER: 10.4 FL (ref 7.5–12.5)
POTASSIUM SERPL-SCNC: 4.4 MMOL/L (ref 3.5–5.3)
PROT SERPL-MCNC: 6.4 G/DL (ref 6.1–8.1)
RBC # BLD AUTO: 3.97 MILLION/UL (ref 3.8–5.1)
SODIUM SERPL-SCNC: 137 MMOL/L (ref 135–146)
TRIGL SERPL-MCNC: 113 MG/DL
WBC # BLD AUTO: 5.3 THOUSAND/UL (ref 3.8–10.8)